# Patient Record
(demographics unavailable — no encounter records)

---

## 2025-01-23 NOTE — HISTORY OF PRESENT ILLNESS
[FreeTextEntry1] : Location: back Severity: 7/10 Duration: over 1 y r Context: had right reverse shoulder replacement and exercises hurt her back Aggravating Factors: walking, exercise Alleviating Factors: rest Associated Symptoms: denies weight loss, fever, chills, change in bowel/bladder habits, weakness, numbness/tingling, radiation down leg Prior Studies: xray 9/2023 9/2023 left GT injection 9/2023 right GT injection 12/2023 left GT injection

## 2025-01-23 NOTE — ASSESSMENT
[FreeTextEntry1] : Discussed diagnosis and treatment plan including PT. limit back flex and ext ice area often already tried many nsaids consider facet inj vs dayne  seen and examined with fellow Netta Staley

## 2025-01-23 NOTE — PROCEDURE
[de-identified] : Indication: myofascial pain   After informed consent, she elected to proceed with a trigger point injection into the right L5 paraspinals, gluteus desiree. I confirmed no prior adverse reactions, no active infections, and no relevant allergies.   The skin was prepped in the usual sterile manner.  The sites were injected with Lidocaine followed by local needling. The injection was completed without complication and a bandage was applied. She tolerated the procedure well and was given post-injection instructions.   Cold Tx x 48 hours, analgesics prn. Medications: 0.5 ml of 1% Lidocaine per site   Exp 7/2025 Manufacture: [employer] NDC 1262-6657-32 LOT [id number]

## 2025-01-23 NOTE — PHYSICAL EXAM
[FreeTextEntry1] : KENNETH is a 64 year old female Constitutional: healthy appearing, NAD, and normal body habitus   LUMBAR ROM: flexion to 30 deg, ext to 5 deg with pain   Gait: normal   Inspection: no erythema, warmth Spine: no TTP in spinous process Bony palpation: no TTP in GT   Soft tissue palpation hip:  TTP in gluteus desiree Soft tissue palpation of spine:  TTP in lumbar paraspinals   5/5 bilateral KE, DF, PF sensation intact in bilat LE reflexes: knee                ankle   Special tests: neg seated SLR

## 2025-02-14 NOTE — ASSESSMENT
[FreeTextEntry1] : Discussed diagnosis and treatment plan including PT. limit back flex and ext ice area often already tried many nsaids consider facet inj vs dayne She understands risk of immunosuppression from steroids.

## 2025-02-14 NOTE — PROCEDURE
[de-identified] : indication: pain  Ultrasound Guided Right GT Injection  After discussion of the risks and benefits, the patient has elected to proceed with a Greater Trochanteric Bursa Steroid and Anesthetic Injection. The skin was sterilized with Betadine, then alcohol. A needle was inserted down to the GT using ultrasound guidance.  Then a steroid solution consisting of 20 mg of Kenalog and 2 ml of 1% Lidocaine was instilled.   She tolerated the procedure well and was discharged in good condition.       Lidocaine Manufacture [employer] NDC 48776-793-60 Exp 9/25 LOT [id number]       Triamcinolone NDC [address]-4 Exp 11/25 Lot [id number] Manufacture [employer]

## 2025-04-01 NOTE — PHYSICAL EXAM
[FreeTextEntry1] : KENNETH is a 64 year old female Constitutional: healthy appearing, NAD, and overweight   LUMBAR ROM: flexion to 30 deg, ext to 5 deg with pain   Gait: normal   Inspection: no erythema, warmth Spine: no TTP in spinous process Bony palpation:  TTP in GT on left   Soft tissue palpation hip:  TTP in gluteus medius on left Soft tissue palpation of spine:  TTP in lumbar paraspinals   5/5 bilateral KE, DF, PF sensation intact in bilat LE reflexes: knee                ankle   Special tests: neg seated SLR

## 2025-04-01 NOTE — ASSESSMENT
[FreeTextEntry1] : Discussed diagnosis and treatment plan including PT. limit back flex and ext start icing area often again do hip abd standing with bands since had shoulder surgery already tried many nsaids consider facet inj vs dayne She understands risk of immunosuppression from steroids.    f/u 2 wk

## 2025-04-01 NOTE — PROCEDURE
[de-identified] : indication: pain  Ultrasound Guided Left GT Injection  After discussion of the risks and benefits, the patient has elected to proceed with a Greater Trochanteric Bursa Steroid and Anesthetic Injection. The skin was sterilized with Betadine, then alcohol. A needle was inserted down to the GT using ultrasound guidance.  Then a steroid solution consisting of 20 mg of Kenalog and 2 ml of 1% Lidocaine was instilled.   She tolerated the procedure well and was discharged in good condition.       Lidocaine Manufacture [employer] NDC 92477-292-35 Exp 9/25 LOT [id number]       Triamcinolone NDC [address]-6 Exp 11/25 Lot [id number] Manufacture [employer]     Indication: myofascial pain   After informed consent, she elected to proceed with a trigger point injection into the left gluteus medius in 2 spots. I confirmed no prior adverse reactions, no active infections, and no relevant allergies.   The skin was prepped in the usual sterile manner.  The sites were injected with Lidocaine followed by local needling. The injection was completed without complication and a bandage was applied. She tolerated the procedure well and was given post-injection instructions.   Cold Tx x 48 hours, analgesics prn. Medications: 0.5 ml of 1% Lidocaine per site   Exp 7/2025 Manufacture: [employer] NDC 0466-7914-69 LOT [id number]

## 2025-04-01 NOTE — HISTORY OF PRESENT ILLNESS
[FreeTextEntry1] : Location: back Severity: 4/10 Duration: over 1 y r Context: had right reverse shoulder replacement and exercises hurt her back Aggravating Factors: walking, exercise, sleeping Alleviating Factors: rest Associated Symptoms: denies weight loss, fever, chills, change in bowel/bladder habits, weakness, numbness/tingling, radiation down leg, +left hip 6/10, right mild Prior Studies: xray 9/2023 9/2023 left GT injection 9/2023 right GT injection 12/2023 left GT injection 2/2025 right GT injection

## 2025-06-19 NOTE — HISTORY OF PRESENT ILLNESS
Airway    Performed by: Deborah Bonilla CRNA  Authorized by: Jorge Montemayor MD    Final Airway Type:  Endotracheal airway  Final Endotracheal Airway*:  ETT  ETT Size (mm)*:  7.5  Cuff*:  Regular  Technique Used for Successful ETT Placement:  Video laryngoscopy  Devices/Methods Used in Placement*:  Mask and Stylet  Intubation Procedure*:  ETCO2, Preoxygenation, Atraumatic, Dentition Unchanged and Pharynx Clear  Insertion Site:  Oral  Blade Type*:  Video Laryngoscope  Blade Size*:  3  Measured from*:  Teeth  Secured at (cm)*:  21  Placement Verified by: auscultation, capnometry and palpation of cuff    Glottic View*:  1 - full view of glottis  Attempts*:  1  Ventilation Between Attempts:  None  Number of Other Approaches Attempted:  0   Patient Identified, Procedure confirmed, Emergency equipment available and Safety protocols followed  Location:  OR  Urgency:  Elective  Difficult Airway: No    Indications for Airway Management:  Anesthesia  Sedation Level:  Deep  Mask Difficulty Assessment:  1 - vent by mask  Start Time: 6/19/2025 12:13 PM   Dentition and OP same as pre op       
[FreeTextEntry1] : Location: back Severity: 7/10 Duration: over 1 y r Context: had right reverse shoulder replacement and exercises hurt her back Aggravating Factors: walking, exercise, sleeping Alleviating Factors: rest Associated Symptoms: denies weight loss, fever, chills, change in bowel/bladder habits, weakness, numbness/tingling, radiation down leg Prior Studies: xray 9/2023 9/2023 left GT injection 9/2023 right GT injection 12/2023 left GT injection

## 2025-07-22 NOTE — HISTORY OF PRESENT ILLNESS
[FreeTextEntry1] : Location: back Severity: 4/10 Duration: over 1 yr Context: had right reverse shoulder replacement and exercises hurt her back Aggravating Factors: walking, exercise, sleeping Alleviating Factors: rest Associated Symptoms: denies weight loss, fever, chills, change in bowel/bladder habits, weakness, numbness/tingling, radiation down leg, +left hip 6/10, right mild Prior Studies: xray 9/2023 9/2023 left GT injection 9/2023 right GT injection 12/2023 left GT injection 2/2025 right GT injection 4/2025 left GT injection

## 2025-07-22 NOTE — PROCEDURE
[de-identified] : Indication: myofascial pain   After informed consent, she elected to proceed with a trigger point injection into the right paraspinals and gluteus desiree in 2 spots. I confirmed no prior adverse reactions, no active infections, and no relevant allergies.   The skin was prepped in the usual sterile manner.  The sites were injected with Lidocaine followed by local needling. The injection was completed without complication and a bandage was applied. She tolerated the procedure well and was given post-injection instructions.   Cold Tx x 48 hours, analgesics prn. Medications: 0.5 ml of 1% Lidocaine per site   Exp 3/2026 Manufacture: [employer] NDC 8934-2099-34 LOT [id number]

## 2025-07-22 NOTE — ASSESSMENT
[FreeTextEntry1] : MRI L5/S1 HNP pinching S1. Discussed diagnosis and treatment plan including PT. limit back flex and ext  icing area often again do hip abd standing with bands since had shoulder surgery already tried many nsaids consider facet inj vs dayne  Hip impingement: Avoid HF > 90 deg and hip IR.  She has had pain for more than 6 months and failed conservative treatment including meds and PT.  Exam positive for [place], distraction, ANJU, and compression tests.  f/u 2 wk

## 2025-07-22 NOTE — PHYSICAL EXAM
[FreeTextEntry1] : KENNETH is a 65 year old female Constitutional: healthy appearing, NAD, and overweight   LUMBAR ROM: flexion to 30 deg, ext to 5 deg    Gait: antalgic   Inspection: no erythema, warmth Spine: no TTP in spinous process Bony palpation:  TTP in GT on left, TTP in right SI joint   Soft tissue palpation hip:  TTP in gluteus medius on left Soft tissue palpation of spine:  TTP in lumbar paraspinals   5/5 bilateral KE, DF, PF sensation intact in bilat LE    Special tests: neg seated SLR